# Patient Record
Sex: FEMALE | Race: WHITE
[De-identification: names, ages, dates, MRNs, and addresses within clinical notes are randomized per-mention and may not be internally consistent; named-entity substitution may affect disease eponyms.]

---

## 2022-06-06 ENCOUNTER — HOSPITAL ENCOUNTER (OUTPATIENT)
Dept: HOSPITAL 47 - RADXRYALE | Age: 22
Discharge: HOME | End: 2022-06-06
Attending: PHYSICIAN ASSISTANT
Payer: COMMERCIAL

## 2022-06-06 DIAGNOSIS — M54.50: Primary | ICD-10-CM

## 2022-06-06 PROCEDURE — 72100 X-RAY EXAM L-S SPINE 2/3 VWS: CPT

## 2022-06-06 NOTE — XR
EXAM TYPE: LUMBAR SPINE X RAY SERIES

 

COMPARISON: NONE

 

HISTORY: Pain

 

TECHNIQUE: 3 views are submitted.

 

FINDINGS:

Alignment is anatomic.  The pedicles are intact.  The transverse processes are intact.  There is no s
pondylolysis or spondylolisthesis.  

 

IMPRESSION:

1. No acute process.  If symptoms persist consider MRI.